# Patient Record
Sex: FEMALE | Race: WHITE | NOT HISPANIC OR LATINO | Employment: FULL TIME | URBAN - NONMETROPOLITAN AREA
[De-identification: names, ages, dates, MRNs, and addresses within clinical notes are randomized per-mention and may not be internally consistent; named-entity substitution may affect disease eponyms.]

---

## 2021-02-25 ENCOUNTER — OFFICE VISIT (OUTPATIENT)
Dept: UROLOGY | Facility: CLINIC | Age: 61
End: 2021-02-25

## 2021-02-25 VITALS
TEMPERATURE: 97.3 F | DIASTOLIC BLOOD PRESSURE: 68 MMHG | BODY MASS INDEX: 25.61 KG/M2 | HEIGHT: 64 IN | OXYGEN SATURATION: 98 % | HEART RATE: 73 BPM | WEIGHT: 150 LBS | SYSTOLIC BLOOD PRESSURE: 122 MMHG

## 2021-02-25 DIAGNOSIS — R31.9 HEMATURIA, UNSPECIFIED TYPE: Primary | ICD-10-CM

## 2021-02-25 LAB
BILIRUB BLD-MCNC: NEGATIVE MG/DL
CLARITY, POC: CLEAR
COLOR UR: YELLOW
GLUCOSE UR STRIP-MCNC: NEGATIVE MG/DL
KETONES UR QL: NEGATIVE
LEUKOCYTE EST, POC: NEGATIVE
NITRITE UR-MCNC: NEGATIVE MG/ML
PH UR: 6 [PH] (ref 5–8)
PROT UR STRIP-MCNC: NEGATIVE MG/DL
RBC # UR STRIP: ABNORMAL /UL
SP GR UR: 1.01 (ref 1–1.03)
UROBILINOGEN UR QL: NORMAL

## 2021-02-25 PROCEDURE — 81003 URINALYSIS AUTO W/O SCOPE: CPT | Performed by: UROLOGY

## 2021-02-25 PROCEDURE — 99244 OFF/OP CNSLTJ NEW/EST MOD 40: CPT | Performed by: UROLOGY

## 2021-02-25 RX ORDER — LORATADINE 10 MG/1
CAPSULE, LIQUID FILLED ORAL
COMMUNITY

## 2021-02-25 RX ORDER — ATORVASTATIN CALCIUM 10 MG/1
10 TABLET, FILM COATED ORAL DAILY
COMMUNITY

## 2021-02-25 RX ORDER — ACYCLOVIR 400 MG/1
400 TABLET ORAL
COMMUNITY

## 2021-02-25 RX ORDER — NAPROXEN 500 MG/1
500 TABLET ORAL 2 TIMES DAILY WITH MEALS
COMMUNITY

## 2021-02-25 NOTE — PROGRESS NOTES
"Chief Complaint  Microscopic hematuria    Referring provider  Mandiarnold Arellano, KT    HPI  Ms. Amanda is a 60 y.o. female with history of follicular basal cell carcinoma who presents with microscopic hematuria.  Patient does not have current hematuria.    The patient was a 1 pack per day smoker for about 30 years. 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 She thinks it was \"R and B\" chemotherapy. She does not have a history of kidney stones. She did have a history of urinary tract infections in the past, but not recently. The patient notes that Ms. Arellano may have referred her due to thinking she had vagina dryness. She denies having vaginal dryness. She is not sexually active. She denies any urinary incontinence.     History of smoking?    yes  History of second-hand smoking exposure? no  History of chemotherapy?   yes  History of radiation?    yes  History of kidney or bladder stones?  no  History of frequent urinary tract infections? no    She currently denies fevers, chills, nausea, vomiting, constipation or flank pain.    Past Medical History  Past Medical History:   Diagnosis Date   • Follicular basal cell carcinoma 2015       Past Surgical History  Past Surgical History:   Procedure Laterality Date   • BREAST RECONSTRUCTION     • FINGER SURGERY     • HYSTERECTOMY     • MASTECTOMY COMPLETE / SIMPLE Bilateral        Medications    Current Outpatient Medications:   •  acyclovir (ZOVIRAX) 400 MG tablet, Take 400 mg by mouth Every 4 (Four) Hours While Awake. Take no more than 5 doses a day., Disp: , Rfl:   •  atorvastatin (LIPITOR) 10 MG tablet, Take 10 mg by mouth Daily., Disp: , Rfl:   •  Loratadine (Claritin) 10 MG capsule, Take  by mouth., Disp: , Rfl:   •  naproxen (NAPROSYN) 500 MG tablet, Take 500 mg by mouth 2 (Two) Times a Day With Meals., Disp: , Rfl:     Allergies  Allergies   Allergen Reactions   • Bactrim [Sulfamethoxazole-Trimethoprim] Hives   • Penicillins " "Hives   • Statins Other (See Comments)       Social History  Social History     Socioeconomic History   • Marital status: Unknown     Spouse name: Not on file   • Number of children: Not on file   • Years of education: Not on file   • Highest education level: Not on file   Tobacco Use   • Smoking status: Former Smoker     Packs/day: 1.00     Types: Cigarettes     Quit date:      Years since quittin.1   • Smokeless tobacco: Never Used   Substance and Sexual Activity   • Alcohol use: Yes     Frequency: Monthly or less     Drinks per session: 3 or 4   • Drug use: Defer   • Sexual activity: Defer       Family History  She has no family history of bladder or kidney cancer    Review of Systems  Constitutional: No fevers or chills  Skin: Negative for rash  Endocrine: No heat/cold intolerance   Cardiovascular: Negative for chest pain or dyspnea on exertion  Respiratory: Negative for shortness of breath or wheezing  Gastrointestinal: No constipation, nausea or vomiting  Genitourinary: Negative for current gross hematuria, no lower urinary tract symptoms or dysuria.  Musculoskeletal: No flank pain  Neurological:  Negative for frequent headaches or dizziness  Lymph/Heme: Negative for leg swelling or calf pain.    Physical Exam  Visit Vitals  /68   Pulse 73   Temp 97.3 °F (36.3 °C)   Ht 162.6 cm (64\")   Wt 68 kg (150 lb)   SpO2 98%   BMI 25.75 kg/m²     Constitutional: NAD, WDWN.   HEENT: NCAT. Conjunctivae normal.  MMM.    Cardiovascular: Regular rate.  Pulmonary/Chest: Respirations are even and non-labored bilaterally.  Abdominal: Soft. No distension, tenderness, masses or guarding. No CVA tenderness.  Neurological: A + O x 3.  Cranial Nerves II-XII grossly intact.  Extremities: FARZAD x 4, Warm. No clubbing.  No cyanosis.    Skin: Pink, warm and dry.  No rashes noted.  Psychiatric:  Normal mood and affect  Genitourinary  Nonpalpable bladder    Labs  Brief Urine Lab Results  (Last result in the past 365 days)      " Color   Clarity   Blood   Leuk Est   Nitrite   Protein   CREAT   Urine HCG        02/25/21 0946 Yellow Clear Trace Negative Negative Negative                 Chemistry    No results found for: NA, K, CL, CO2, BUN, CREATININE, GLU No results found for: CALCIUM, ALKPHOS, AST, ALT, BILITOT         No results found for: WBC, HGB, HCT, MCV, PLT    No results found for: URINECX    Radiologic Studies  No Images in the past 120 days found..    I have reviewed and interpreted the urinalysis obtained by Ms. Arellano in the past.    Assessment  60 y.o. female who presents with microscopic hematuria.  This is a new problem with uncertain prognosis.  Risk factors associated with planned cystoscopy include prior history of malignancy, chemotherapy, and smoking history.  In order to complete the hematuria work up we need to perform a flexible cystoscopy and acquire upper tract imaging.    Plan  1.  We discussed the indications for diagnostic flexible cystoscopy to be performed at the next clinic visit.  2.  CT urogram.    Scribed for Giuseppe Lujan MD by ELLIOTT MCCALLUM.  2/25/2021  13:50 EST    I Giuseppe Lujan MD have personally performed the services described in this document as scribed by the above individual, and it is both accurate and complete.     Giuseppe Lujan MD  2/26/2021  09:46 EST        Giuseppe Lujan MD

## 2021-02-26 LAB
APPEARANCE UR: CLEAR
BACTERIA #/AREA URNS HPF: NORMAL /HPF
BILIRUB UR QL STRIP: NEGATIVE
COLOR UR: YELLOW
EPI CELLS #/AREA URNS HPF: NORMAL /HPF
GLUCOSE UR QL: NEGATIVE
HGB UR QL STRIP: ABNORMAL
KETONES UR QL STRIP: NEGATIVE
LEUKOCYTE ESTERASE UR QL STRIP: NEGATIVE
NITRITE UR QL STRIP: NEGATIVE
PH UR STRIP: 6 [PH] (ref 5–8)
PROT UR QL STRIP: NEGATIVE
RBC #/AREA URNS HPF: NORMAL /HPF
SP GR UR: 1.01 (ref 1–1.03)
UROBILINOGEN UR STRIP-MCNC: ABNORMAL MG/DL
WBC #/AREA URNS HPF: NORMAL /HPF

## 2021-03-10 ENCOUNTER — HOSPITAL ENCOUNTER (OUTPATIENT)
Dept: CT IMAGING | Facility: HOSPITAL | Age: 61
Discharge: HOME | End: 2021-03-10
Admitting: UROLOGY

## 2021-03-10 DIAGNOSIS — R31.9 HEMATURIA, UNSPECIFIED TYPE: ICD-10-CM

## 2021-03-10 LAB — CREAT BLDA-MCNC: 0.9 MG/DL (ref 0.6–1.3)

## 2021-03-10 PROCEDURE — 25010000002 IOPAMIDOL 61 % SOLUTION: Performed by: UROLOGY

## 2021-03-10 PROCEDURE — 74178 CT ABD&PLV WO CNTR FLWD CNTR: CPT

## 2021-03-10 PROCEDURE — 82565 ASSAY OF CREATININE: CPT

## 2021-03-10 RX ADMIN — IOPAMIDOL 100 ML: 612 INJECTION, SOLUTION INTRAVENOUS at 08:44

## 2021-03-25 ENCOUNTER — PROCEDURE VISIT (OUTPATIENT)
Dept: UROLOGY | Facility: CLINIC | Age: 61
End: 2021-03-25

## 2021-03-25 DIAGNOSIS — R31.9 HEMATURIA, UNSPECIFIED TYPE: Primary | ICD-10-CM

## 2021-03-25 PROCEDURE — 52000 CYSTOURETHROSCOPY: CPT | Performed by: UROLOGY

## 2021-03-25 NOTE — PROGRESS NOTES
"Chief Complaint  Microscopic hematuria    Referring provider  Mandiarnold Arellano, KT    HPI  Ms. Amanda is a 61 y.o. female with history of follicular basal cell carcinoma who presents with microscopic hematuria.  Patient does not have current hematuria.    The patient was a 1 pack per day smoker for about 30 years. 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 She thinks it was \"R and B\" chemotherapy. She does not have a history of kidney stones. She did have a history of urinary tract infections in the past, but not recently. The patient notes that Ms. Arellano may have referred her due to thinking she had vagina dryness. She denies having vaginal dryness. She is not sexually active. She denies any urinary incontinence.     History of smoking?    yes  History of second-hand smoking exposure? no  History of chemotherapy?   yes  History of radiation?    yes  History of kidney or bladder stones?  no  History of frequent urinary tract infections? no    She currently denies fevers, chills, nausea, vomiting, constipation or flank pain.    Past Medical History  Past Medical History:   Diagnosis Date   • Follicular basal cell carcinoma 2015       Past Surgical History  Past Surgical History:   Procedure Laterality Date   • BREAST RECONSTRUCTION     • FINGER SURGERY     • HYSTERECTOMY     • MASTECTOMY COMPLETE / SIMPLE Bilateral        Medications    Current Outpatient Medications:   •  acyclovir (ZOVIRAX) 400 MG tablet, Take 400 mg by mouth Every 4 (Four) Hours While Awake. Take no more than 5 doses a day., Disp: , Rfl:   •  atorvastatin (LIPITOR) 10 MG tablet, Take 10 mg by mouth Daily., Disp: , Rfl:   •  Loratadine (Claritin) 10 MG capsule, Take  by mouth., Disp: , Rfl:   •  naproxen (NAPROSYN) 500 MG tablet, Take 500 mg by mouth 2 (Two) Times a Day With Meals., Disp: , Rfl:     Allergies  Allergies   Allergen Reactions   • Bactrim [Sulfamethoxazole-Trimethoprim] Hives   • Penicillins " Hives   • Statins Other (See Comments)       Social History  Social History     Socioeconomic History   • Marital status: Unknown     Spouse name: Not on file   • Number of children: Not on file   • Years of education: Not on file   • Highest education level: Not on file   Tobacco Use   • Smoking status: Former Smoker     Packs/day: 1.00     Types: Cigarettes     Quit date:      Years since quittin.2   • Smokeless tobacco: Never Used   Vaping Use   • Vaping Use: Former   • Quit date: 2015   Substance and Sexual Activity   • Alcohol use: Yes   • Drug use: Defer   • Sexual activity: Defer       Family History  She has no family history of bladder or kidney cancer    Review of Systems  Constitutional: No fevers or chills  Skin: Negative for rash  Endocrine: No heat/cold intolerance   Cardiovascular: Negative for chest pain or dyspnea on exertion  Respiratory: Negative for shortness of breath or wheezing  Gastrointestinal: No constipation, nausea or vomiting  Genitourinary: Negative for current gross hematuria, no lower urinary tract symptoms or dysuria.  Musculoskeletal: No flank pain  Neurological:  Negative for frequent headaches or dizziness  Lymph/Heme: Negative for leg swelling or calf pain.    Physical Exam  There were no vitals taken for this visit.  Constitutional: NAD, WDWN.   HEENT: NCAT. Conjunctivae normal.  MMM.    Cardiovascular: Regular rate.  Pulmonary/Chest: Respirations are even and non-labored bilaterally.  Abdominal: Soft. No distension, tenderness, masses or guarding. No CVA tenderness.  Neurological: A + O x 3.  Cranial Nerves II-XII grossly intact.  Extremities: FARZAD x 4, Warm. No clubbing.  No cyanosis.    Skin: Pink, warm and dry.  No rashes noted.  Psychiatric:  Normal mood and affect  Genitourinary  Nonpalpable bladder    Labs  Brief Urine Lab Results  (Last result in the past 365 days)      Color   Clarity   Blood   Leuk Est   Nitrite   Protein   CREAT   Urine HCG        21  1357 Yellow  Comment:  REFERENCE RANGE: Yellow, Straw Clear Trace Negative Negative Negative                 Chemistry        Component Value Date/Time    CREATININE 0.90 03/10/2021 0828    No results found for: CALCIUM, ALKPHOS, AST, ALT, BILITOT         No results found for: WBC, HGB, HCT, MCV, PLT    No results found for: URINECX    Radiologic Studies  CT Abdomen Pelvis With & Without Contrast    Result Date: 3/10/2021  Small bilateral renal cyst with no enhancing renal mass or hydronephrosis.  2559.90 mGy.cm   This study was performed with techniques to keep radiation doses as low as reasonably achievable (ALARA). Individualized dose reduction techniques using automated exposure control or adjustment of mA and/or kV according to the patient size were employed.  This report was finalized on 3/10/2021 9:39 AM by Kaylynn Hirsch M.D..      Preprocedure diagnosis  Microscopic hematuria    Postprocedure diagnosis  Same    Procedure  Flexible Cystourethroscopy    Attending surgeon  Giuseppe Lujan MD    Anesthesia  2% lidocaine jelly intraurethrally    Complications  None    Indications  61 y.o. female undergoing a flexible cystoscopy for the above mentioned indications.    Informed consent was obtained.      Findings  Cystoscopy revealed one right and left ureteral orifice in the normal anatomic position, normal bladder mucosa and no tumors, masses or stones.      Procedure  The patient was placed in supine position and prepped and draped in sterile fashion with lidocaine jelly per urethra for anesthesia.  A timeout was performed.  The 14F flexible cystoscope was lubricated and gently placed through the urethra and into the bladder.  The bladder was completely visualized.  The cystoscope was retroflexed and the bladder neck visualized.  The scope was withdrawn and the procedure terminated.  The patient tolerated the procedure well.          Assessment  61 y.o. female who presents with microscopic hematuria.   Work-up of CT scan and cystoscopy was all completely negative.    Plan  1.  Follow-up as needed